# Patient Record
Sex: FEMALE | Race: WHITE | NOT HISPANIC OR LATINO | URBAN - METROPOLITAN AREA
[De-identification: names, ages, dates, MRNs, and addresses within clinical notes are randomized per-mention and may not be internally consistent; named-entity substitution may affect disease eponyms.]

---

## 2023-06-14 ENCOUNTER — APPOINTMENT (RX ONLY)
Dept: URBAN - METROPOLITAN AREA CLINIC 321 | Facility: CLINIC | Age: 28
Setting detail: DERMATOLOGY
End: 2023-06-14

## 2023-06-14 DIAGNOSIS — G90.09 OTHER IDIOPATHIC PERIPHERAL AUTONOMIC NEUROPATHY: ICD-10-CM | Status: INADEQUATELY CONTROLLED

## 2023-06-14 DIAGNOSIS — E28.2 POLYCYSTIC OVARIAN SYNDROME: ICD-10-CM | Status: INADEQUATELY CONTROLLED

## 2023-06-14 PROCEDURE — 99203 OFFICE O/P NEW LOW 30 MIN: CPT

## 2023-06-14 PROCEDURE — ? TREATMENT REGIMEN

## 2023-06-14 PROCEDURE — ? ADDITIONAL NOTES

## 2023-06-14 PROCEDURE — ? COUNSELING

## 2023-06-14 PROCEDURE — ? FULL BODY SKIN EXAM - DECLINED

## 2023-06-14 ASSESSMENT — LOCATION ZONE DERM
LOCATION ZONE: LEG
LOCATION ZONE: ARM
LOCATION ZONE: TRUNK

## 2023-06-14 ASSESSMENT — LOCATION DETAILED DESCRIPTION DERM
LOCATION DETAILED: LEFT BUTTOCK
LOCATION DETAILED: PERIUMBILICAL SKIN
LOCATION DETAILED: LEFT ANTERIOR DISTAL THIGH
LOCATION DETAILED: LEFT ANTERIOR PROXIMAL UPPER ARM

## 2023-06-14 ASSESSMENT — LOCATION SIMPLE DESCRIPTION DERM
LOCATION SIMPLE: ABDOMEN
LOCATION SIMPLE: LEFT THIGH
LOCATION SIMPLE: LEFT BUTTOCK
LOCATION SIMPLE: LEFT UPPER ARM

## 2023-06-14 NOTE — PROCEDURE: TREATMENT REGIMEN
Plan: Referred to [facility] to r/o small fiber sensory neuropathy Plan: Referred to [facility] to r/o small fiber sensory neuropathy

## 2023-06-14 NOTE — HPI: OTHER
EXAMINATION TYPE: US OB >= 14 wk fetus

 

DATE OF EXAM: 2020

 

COMPARISON: None

 

CLINICAL HISTORY: cramping decreased movementCramping, decreased movement. . Hx D and C.

 

TECHNIQUE:  Transabdominal (TA)

 

GESTATIONAL AGE / DATING

Physician Established: Not yet established 

Dates by LMP: Unknown 

Dates by First Scan: This is first scan 

Dates by Current Scan: (21 weeks/2 days) 

** EDC: 2021 

 

 

FETAL SURVEY

IUP:  Single

PLACENTA: Fundal. Appears to be heterogeneous. Hypoechoic areas seen. Largest appears to measure: 2.7
 x 3.9 x 0.7 cm.      

PREVIA:  No Previa seen

** WILI: 18.6 cm Normal

CERVICAL LENGTH (transabdominal: norm > 3.0cm): 3.39  cm

*No transvaginal exam per ordering physician.

 

FETAL BIOMETRY

PRESENTATION:  Vertex

FETAL LIE:  Longitudinal

BPD: 5.09 cm

**  21 weeks / 3 days

HC: 19.37 cm

**  21 weeks / 4 days

AC: 16.75 cm

**  21 weeks / 5 days

FL: 3.63 cm

**  21 weeks / 4 days

ESTIMATED FETAL WEIGHT IN GRAMS:  439.17 grams

ESTIMATED FETAL WEIGHT IN LBS/OZ:  0 lbs. 15 oz. 

WEIGHT PERCENTAGE BASED ON ESTABLISHED DATES: Dates unknown.

HC/AC: 1.16 Normal

FL/AC: 21.66 Normal 

HEART RATE:  151 bpm 

RHYTHM:  Normal

 

 

 

 

 

IMPRESSION:

The ultrasound gestational age is 21 weeks and 2 days. There is no evidence of placenta previa or nabeel
cental abruption.
Condition:: Tingling/burning sensation that migrates throughout body. Started about 1 year ago. Cannot think of any injuries happening around that time.
Please Describe Your Condition:: Moderate-severe tingling & burning sensation that migrates throughout body. Started about 1 year ago. Cannot think of any injuries happening around that time.

## 2023-06-14 NOTE — PROCEDURE: ADDITIONAL NOTES
Additional Notes: **Discussed that her skin exam is unremarkable today. Discussed pathogenesis of burn sensation and discussed that she should photograph any skin signs she notices in the future. Discussed that she may benefit from multidisciplinary evaluation the type of which she can only obtain through [facility]. She was advised to consider further work up with Neuro vs evaluation by neuro and derm at [place] to r/o small nerve fiber neuropathy. She can follow up with any new or worsening s/s Additional Notes: **Discussed that her skin exam is unremarkable today. Discussed pathogenesis of burn sensation and discussed that she should photograph any skin signs she notices in the future. Discussed that she may benefit from multidisciplinary evaluation the type of which she can only obtain through [facility]. She was advised to consider further work up with Neuro vs evaluation by neuro and derm at [place] to r/o small nerve fiber neuropathy. She can follow up with any new or worsening s/s

## 2023-06-14 NOTE — PROCEDURE: ADDITIONAL NOTES
Additional Notes: **Managed by her Endocrinologist and GYN. Discussed that patient may want to consider restarting her medications to r/o hormonal component to her symptoms as well as to improve her current PCOS symptoms. She is scheduled to discuss with her GYn later today.